# Patient Record
(demographics unavailable — no encounter records)

---

## 2025-05-23 NOTE — PAST MEDICAL HISTORY
[Menarche Age ____] : age at menarche was [unfilled] [Menopause Age____] : age at menopause was [unfilled] [Approximately ___] : the LMP was approximately [unfilled] [Total Preg ___] : G[unfilled]

## 2025-05-26 NOTE — PHYSICAL EXAM
[Chaperoned Physical Exam] : A chaperone was present in the examining room during all aspects of the physical examination. [Abnormal] : Bimanual Exam: Abnormal [Normal] : Recto-Vaginal Exam: Normal [de-identified] : very narrow vagial canal, especially distally, circumfrential band, small cervix [Fully active, able to carry on all pre-disease performance without restriction] : Status 0 - Fully active, able to carry on all pre-disease performance without restriction

## 2025-05-26 NOTE — HISTORY OF PRESENT ILLNESS
[FreeTextEntry1] : Patient accompanied by her friend  70 yo with FIGO G1 endometrial adenocarcinoma on D+C. Had postmenopausal bleeding, D+C was done by benign gyn at [facility].  She is here for second opinion she saw Dr. Collado at [facility] and she was interested in hearing additional surgical techniques or approaches. The patient states that she has been having bleeding since approximately September 2024.  It has been intermittent not daily would come and go.  Was never heavy.  She went to see her gynecologist who did an ultrasound and recommended a D&C which revealed the above findings. She has recently lost 60 pounds. She has a history of HPV and has had colposcopy in the past she is not sure when the last time she had it was.  She states that she has been getting Pap smears every 6 months.   4/30/2025 [facility] Endometrial adenocarcinoma grade 1 p53 wild type MMR not reported   HCM: Pap: 11/2024 ASCUS HPV positive 16/18 neg Mammo: 2025 C-scope: 2017 PMH: OA, Fatty liver, Fibromyalgia, Prediabetes PSH: Tonsillectomy, LLE skin excision Gyn Hx: Fibroids, HPV+, menopause 2006 Ob Hx: G0 Meds: Omeprazole, MVI Allergies: NKD, contrast- hives FH: Maternal grandmother breast cancer, Brain cancer in 90s Paternal grandmother lung cancer-unknown age Maternal grandfather colon cancer-90 years old SH: Never smoker, no EtOH, no drugs  for many years, not sexually active, retired Lives alone family not nearby

## 2025-05-26 NOTE — DISCUSSION/SUMMARY
[Reviewed Clinical Lab Test(s)] : Results of clinical tests were reviewed. [Discuss Alternatives/Risks/Benefits w/Patient] : All alternatives, risks, and benefits were discussed with the patient/family and all questions were answered.  Patient expressed good understanding and appreciates the importance of follow up as recommended. [Visit Time ___ Minutes] : [unfilled] minutes [FreeTextEntry1] : 69-year-old with FIGO grade 1 endometrioid adenocarcinoma   The natural history of, risks, prognosis and standard of care treatment options for endometrial cancer were reviewed at length. The association of endometrial cancer with unopposed estrogen exposure, obesity or a family history of endometrial cancer were discussed. The following treatment options were reviewed with the patient:    Hysterectomy (open approach)  Hysterectomy (minimally invasive approach)  Primary radiation therapy  No further treatment   We discussed that a total hysterectomy, bilateral salpingo-oophorectomy and lymph node assessment is the standard of care for patients that are of appropriate surgical risk. Risk, benefits and alternatives to hysterectomy were reviewed including bleeding, infection, damage to nerves, blood vessels, bowel, bladder, ureters, urinary incontinence and lymphedema. We discussed the limited role of primary radiation therapy for patients that are of adequate surgical risk; however, the need for possible adjuvant therapies after surgery (including chemotherapy or radiation) was discussed briefly.   We have discussed the risk of bleeding and the possibility of blood transfusions, which the patient is amenable to in the event that it is needed.   We have discussed the possibility of infection, including superficial infection of the wound, the underlying tissues or deep infection in the abdomen or pelvis, as well as urinary tract infection and pneumonia.  We discussed the steps taken to mitigate that risk; but the possibility of need for antibiotics, a prolonged hospital stay, hospital readmission, or ICU stay remain.  We discussed the individual risk of infection is different for each patient and each procedure.   We have discussed risk of damage to surrounding structures, including the large and small bowel, bladder and ureters, blood vessels, and nerves. We discussed that we attempt to identify and fix any injury at the time of surgery, but that this may involve a more extensive operation that originally planned, the possible involvement of additional surgical subspecialists and a longer hospital stay than is typical. We discussed that again, each individual patient and procedure incurs an unequal level of risk of possible complication during surgery.   We discussed risk of problems with healing of wounds related to the surgery. In addition to infection, we discussed wound separation, seroma, hematoma as well as possible scarring when healing leading to unsightly wounds. We discussed care of the wound post-op but that many times these problems cannot be predicted or prevented.   There is an increased risk of VTE and PE related to surgery and hospitalization especially in the setting of a cancer diagnosis. We discussed administration of heparin pre-operatively as well as during hospitalization if necessary to prevent formation of blood clots.   We discussed that there may be medical students, residents and fellows present during her procedure and participating in her post-operative care under my direct supervision. We discussed the importance of individuals participating in the procedure, including myself and assistants, to perform exam under anesthesia.   We have discussed the post operative expectations for the surgery as planned, but deviations from the surgical plan may be required in order to achieve the surgical goals. This may necessitate additional post operative care, in-hospital or at home as determined by the situation. We discussed that with any minimally invasive or laparoscopic/robotic procedure there is a low, but not zero risk of conversion to an open surgery.   Based on exam and imaging findings, a minimally invasive surgical approach is recommended. The benefits of minimally invasive surgery were extensively reviewed, including fewer perioperative complications, shorter hospital stays and faster recovery time when compared to surgery via a laparotomy incision. Minimally invasive surgery in this setting is generally performed with a robotic approach. Randomized controlled trials demonstrate that endometrial cancer outcomes and survival are similar when women are treated with a minimally invasive or an open abdominal approach. The patient is aware that conversion to an open abdominal procedure may be required.   We discussed the utility of sentinel node mapping with ICG in endometrial cancer and that it is effective with a low false negative rate in all histological subtypes. A low risk of allergic reaction to the ICG has been reported. We also discussed that in the case of failed mapping, a bilateral or unilateral lymphadenectomy will be performed at the surgeon's discretion depending on results of frozen pathology that may be utilized in the event of failed mapping.   Potential benefits of sentinel nodes including a higher detection rate for metastasis due to ultra-staging and potential reduction in operative morbidity. However, there remains uncertainty as to the role for treatment of micro-metastatic disease. Further, the benefit of operative morbidity associated with the SLN technique in endometrial cancer is not completely known. In other patient populations (e.g. the cervical cancer population) there has been observed reductions in morbidity with SLN biopsy compared to pelvic lymphadenectomy. Lymphedema, nerve dysfunction and lymphocysts are all potential risks with the SLN technique, however this risk is likely mitigated as compared to complete lymphadenectomy.   I gave the patient the opportunity to ask questions and answered all of them to her satisfaction. Support was also offered, and we will schedule the patient for a preoperative assessment and surgery. The patient is aware that final treatment and surveillance recommendations will be made after her case is reviewed by pathology and at our multidisciplinary tumor board conference following surgery.  Plan for exam under anesthesia, robotic assisted total laparoscopic hysterectomy, BSO, SLN mapping, possible pelvic and para-aortic lymph node dissection.  Medical eval PST CT due to duration of bleeding  Prednisone ordered for premedication for contrast    Love Duarte MD, FACOG  Gynecologic Oncology

## 2025-05-26 NOTE — HISTORY OF PRESENT ILLNESS
[FreeTextEntry1] : Patient accompanied by her friend  68 yo with FIGO G1 endometrial adenocarcinoma on D+C. Had postmenopausal bleeding, D+C was done by benign gyn at [facility].  She is here for second opinion she saw Dr. Collado at [facility] and she was interested in hearing additional surgical techniques or approaches. The patient states that she has been having bleeding since approximately September 2024.  It has been intermittent not daily would come and go.  Was never heavy.  She went to see her gynecologist who did an ultrasound and recommended a D&C which revealed the above findings. She has recently lost 60 pounds. She has a history of HPV and has had colposcopy in the past she is not sure when the last time she had it was.  She states that she has been getting Pap smears every 6 months.   4/30/2025 [facility] Endometrial adenocarcinoma grade 1 p53 wild type MMR not reported   HCM: Pap: 11/2024 ASCUS HPV positive 16/18 neg Mammo: 2025 C-scope: 2017 PMH: OA, Fatty liver, Fibromyalgia, Prediabetes PSH: Tonsillectomy, LLE skin excision Gyn Hx: Fibroids, HPV+, menopause 2006 Ob Hx: G0 Meds: Omeprazole, MVI Allergies: NKD, contrast- hives FH: Maternal grandmother breast cancer, Brain cancer in 90s Paternal grandmother lung cancer-unknown age Maternal grandfather colon cancer-90 years old SH: Never smoker, no EtOH, no drugs  for many years, not sexually active, retired Lives alone family not nearby

## 2025-05-26 NOTE — PHYSICAL EXAM
[Chaperoned Physical Exam] : A chaperone was present in the examining room during all aspects of the physical examination. [Abnormal] : Bimanual Exam: Abnormal [Normal] : Recto-Vaginal Exam: Normal [de-identified] : very narrow vagial canal, especially distally, circumfrential band, small cervix [Fully active, able to carry on all pre-disease performance without restriction] : Status 0 - Fully active, able to carry on all pre-disease performance without restriction

## 2025-05-26 NOTE — HISTORY OF PRESENT ILLNESS
[FreeTextEntry1] : Patient accompanied by her friend  68 yo with FIGO G1 endometrial adenocarcinoma on D+C. Had postmenopausal bleeding, D+C was done by benign gyn at [facility].  She is here for second opinion she saw Dr. Collado at [facility] and she was interested in hearing additional surgical techniques or approaches. The patient states that she has been having bleeding since approximately September 2024.  It has been intermittent not daily would come and go.  Was never heavy.  She went to see her gynecologist who did an ultrasound and recommended a D&C which revealed the above findings. She has recently lost 60 pounds. She has a history of HPV and has had colposcopy in the past she is not sure when the last time she had it was.  She states that she has been getting Pap smears every 6 months.   4/30/2025 [facility] Endometrial adenocarcinoma grade 1 p53 wild type MMR not reported   HCM: Pap: 11/2024 ASCUS HPV positive 16/18 neg Mammo: 2025 C-scope: 2017 PMH: OA, Fatty liver, Fibromyalgia, Prediabetes PSH: Tonsillectomy, LLE skin excision Gyn Hx: Fibroids, HPV+, menopause 2006 Ob Hx: G0 Meds: Omeprazole, MVI Allergies: NKD, contrast- hives FH: Maternal grandmother breast cancer, Brain cancer in 90s Paternal grandmother lung cancer-unknown age Maternal grandfather colon cancer-90 years old SH: Never smoker, no EtOH, no drugs  for many years, not sexually active, retired Lives alone family not nearby

## 2025-05-26 NOTE — PHYSICAL EXAM
[Chaperoned Physical Exam] : A chaperone was present in the examining room during all aspects of the physical examination. [Abnormal] : Bimanual Exam: Abnormal [Normal] : Recto-Vaginal Exam: Normal [de-identified] : very narrow vagial canal, especially distally, circumfrential band, small cervix [Fully active, able to carry on all pre-disease performance without restriction] : Status 0 - Fully active, able to carry on all pre-disease performance without restriction